# Patient Record
Sex: MALE | Race: BLACK OR AFRICAN AMERICAN | Employment: PART TIME | URBAN - METROPOLITAN AREA
[De-identification: names, ages, dates, MRNs, and addresses within clinical notes are randomized per-mention and may not be internally consistent; named-entity substitution may affect disease eponyms.]

---

## 2017-10-06 ENCOUNTER — OFFICE VISIT (OUTPATIENT)
Dept: FAMILY MEDICINE CLINIC | Age: 22
End: 2017-10-06

## 2017-10-06 VITALS
TEMPERATURE: 98.3 F | BODY MASS INDEX: 32.54 KG/M2 | SYSTOLIC BLOOD PRESSURE: 110 MMHG | OXYGEN SATURATION: 98 % | DIASTOLIC BLOOD PRESSURE: 65 MMHG | HEART RATE: 61 BPM | RESPIRATION RATE: 14 BRPM | WEIGHT: 275.6 LBS | HEIGHT: 77 IN

## 2017-10-06 DIAGNOSIS — M54.31 SCIATICA OF RIGHT SIDE: Primary | ICD-10-CM

## 2017-10-06 RX ORDER — METHOCARBAMOL 500 MG/1
500 TABLET, FILM COATED ORAL 4 TIMES DAILY
Qty: 60 TAB | Refills: 1 | Status: SHIPPED | OUTPATIENT
Start: 2017-10-06

## 2017-10-06 RX ORDER — NAPROXEN 500 MG/1
500 TABLET ORAL 2 TIMES DAILY WITH MEALS
Qty: 60 TAB | Refills: 1 | Status: SHIPPED | OUTPATIENT
Start: 2017-10-06

## 2017-10-06 RX ORDER — MELATONIN 5 MG
5 CAPSULE ORAL
COMMUNITY

## 2017-10-06 NOTE — PATIENT INSTRUCTIONS
Low Back Pain: Exercises  Your Care Instructions  Here are some examples of typical rehabilitation exercises for your condition. Start each exercise slowly. Ease off the exercise if you start to have pain. Your doctor or physical therapist will tell you when you can start these exercises and which ones will work best for you. How to do the exercises  Press-up    1. Lie on your stomach, supporting your body with your forearms. 2. Press your elbows down into the floor to raise your upper back. As you do this, relax your stomach muscles and allow your back to arch without using your back muscles. As your press up, do not let your hips or pelvis come off the floor. 3. Hold for 15 to 30 seconds, then relax. 4. Repeat 2 to 4 times. Alternate arm and leg (bird dog) exercise    Note: Do this exercise slowly. Try to keep your body straight at all times, and do not let one hip drop lower than the other. 1. Start on the floor, on your hands and knees. 2. Tighten your belly muscles. 3. Raise one leg off the floor, and hold it straight out behind you. Be careful not to let your hip drop down, because that will twist your trunk. 4. Hold for about 6 seconds, then lower your leg and switch to the other leg. 5. Repeat 8 to 12 times on each leg. 6. Over time, work up to holding for 10 to 30 seconds each time. 7. If you feel stable and secure with your leg raised, try raising the opposite arm straight out in front of you at the same time. Knee-to-chest exercise    1. Lie on your back with your knees bent and your feet flat on the floor. 2. Bring one knee to your chest, keeping the other foot flat on the floor (or keeping the other leg straight, whichever feels better on your lower back). 3. Keep your lower back pressed to the floor. Hold for at least 15 to 30 seconds. 4. Relax, and lower the knee to the starting position. 5. Repeat with the other leg. Repeat 2 to 4 times with each leg.   6. To get more stretch, put your other leg flat on the floor while pulling your knee to your chest.  Curl-ups    1. Lie on the floor on your back with your knees bent at a 90-degree angle. Your feet should be flat on the floor, about 12 inches from your buttocks. 2. Cross your arms over your chest. If this bothers your neck, try putting your hands behind your neck (not your head), with your elbows spread apart. 3. Slowly tighten your belly muscles and raise your shoulder blades off the floor. 4. Keep your head in line with your body, and do not press your chin to your chest.  5. Hold this position for 1 or 2 seconds, then slowly lower yourself back down to the floor. 6. Repeat 8 to 12 times. Pelvic tilt exercise    1. Lie on your back with your knees bent. 2. \"Brace\" your stomach. This means to tighten your muscles by pulling in and imagining your belly button moving toward your spine. You should feel like your back is pressing to the floor and your hips and pelvis are rocking back. 3. Hold for about 6 seconds while you breathe smoothly. 4. Repeat 8 to 12 times. Heel dig bridging    1. Lie on your back with both knees bent and your ankles bent so that only your heels are digging into the floor. Your knees should be bent about 90 degrees. 2. Then push your heels into the floor, squeeze your buttocks, and lift your hips off the floor until your shoulders, hips, and knees are all in a straight line. 3. Hold for about 6 seconds as you continue to breathe normally, and then slowly lower your hips back down to the floor and rest for up to 10 seconds. 4. Do 8 to 12 repetitions. Hamstring stretch in doorway    1. Lie on your back in a doorway, with one leg through the open door. 2. Slide your leg up the wall to straighten your knee. You should feel a gentle stretch down the back of your leg. 3. Hold the stretch for at least 15 to 30 seconds. Do not arch your back, point your toes, or bend either knee.  Keep one heel touching the floor and the other heel touching the wall. 4. Repeat with your other leg. 5. Do 2 to 4 times for each leg. Hip flexor stretch    1. Kneel on the floor with one knee bent and one leg behind you. Place your forward knee over your foot. Keep your other knee touching the floor. 2. Slowly push your hips forward until you feel a stretch in the upper thigh of your rear leg. 3. Hold the stretch for at least 15 to 30 seconds. Repeat with your other leg. 4. Do 2 to 4 times on each side. Wall sit    1. Stand with your back 10 to 12 inches away from a wall. 2. Lean into the wall until your back is flat against it. 3. Slowly slide down until your knees are slightly bent, pressing your lower back into the wall. 4. Hold for about 6 seconds, then slide back up the wall. 5. Repeat 8 to 12 times. Follow-up care is a key part of your treatment and safety. Be sure to make and go to all appointments, and call your doctor if you are having problems. It's also a good idea to know your test results and keep a list of the medicines you take. Where can you learn more? Go to http://demetri-alessandra.info/. Enter S313 in the search box to learn more about \"Low Back Pain: Exercises. \"  Current as of: March 21, 2017  Content Version: 11.3  © 7487-7434 [employer]. Care instructions adapted under license by [employer] (which disclaims liability or warranty for this information). If you have questions about a medical condition or this instruction, always ask your healthcare professional. [place] [address] any warranty or liability for your use of this information. Gluteal Strain: Rehab Exercises  Your Care Instructions  Here are some examples of typical rehabilitation exercises for your condition. Start each exercise slowly. Ease off the exercise if you start to have pain.   Your doctor or physical therapist will tell you when you can start these exercises and which ones will work best for you. How to do the exercises  Hip rotator stretch    5. Lie on your back with both knees bent and your feet flat on the floor. 6. Put the ankle of your affected leg on your opposite thigh near your knee. 7. Use your hand to gently push the knee of your affected leg away from your body until you feel a gentle stretch around your hip. 8. Hold the stretch for 15 to 30 seconds. 9. Repeat 2 to 4 times. 10. Repeat steps 1 through 5, but this time use your hand to gently pull your knee toward your opposite shoulder. 11. Switch legs and repeat steps 1 through 6, even if only one hip is sore. Seated hip rotator stretch    8. Sit in a sturdy chair. 9. Cross your affected leg over your knee, resting your foot on top of your knee. 10. Keep your back straight, and slowly lean forward until you feel a stretch in your hip. 11. Hold for 15 to 30 seconds. 12. Switch legs and repeat steps 1 through 4 on your other side. 13. Repeat 2 to 4 times. Hamstring stretch (lying down)    7. Lie flat on your back with your legs straight. If you feel discomfort in your back, place a small towel roll under your lower back. 8. Holding the back of your affected leg, lift your leg straight up and toward your body until you feel a stretch at the back of your thigh. 9. Hold the stretch for at least 30 seconds. 10. Repeat 2 to 4 times. 11. Switch legs and repeat steps 1 through 4, even if only one hip is sore. Bridging    7. Lie on your back with both knees bent. Your knees should be bent about 90 degrees. 8. Then push your feet into the floor, squeeze your buttocks, and lift your hips off the floor until your shoulders, hips, and knees are all in a straight line. 9. Hold for about 6 seconds as you continue to breathe normally, and then slowly lower your hips back down to the floor and rest for up to 10 seconds. 10. Repeat 8 to 12 times. Single-leg bridge    5.  Lie on your back, with your arms at your sides.  6. Bend one knee, and keep that foot flat on the floor. The other leg should be straight. 7. Raise the straight leg up so that the knee is level with the bent knee. 8. Tighten your belly muscles by pulling your belly button in toward your spine. Lift your buttocks up and be careful not to let your hips drop down. 9. Hold for about 6 seconds as you continue to breathe normally, and then slowly lower your hips back down to the floor. 10. Switch legs and repeat steps 1 though 5. 11. Repeat 8 to 12 times. Follow-up care is a key part of your treatment and safety. Be sure to make and go to all appointments, and call your doctor if you are having problems. It's also a good idea to know your test results and keep a list of the medicines you take. Where can you learn more? Go to http://demetri-alessandra.info/. Enter W403 in the search box to learn more about \"Gluteal Strain: Rehab Exercises. \"  Current as of: March 21, 2017  Content Version: 11.3  © 6190-1261 [employer]. Care instructions adapted under license by [employer] (which disclaims liability or warranty for this information). If you have questions about a medical condition or this instruction, always ask your healthcare professional. [address] any warranty or liability for your use of this information. Sciatica: Exercises  Your Care Instructions  Here are some examples of typical rehabilitation exercises for your condition. Start each exercise slowly. Ease off the exercise if you start to have pain. Your doctor or physical therapist will tell you when you can start these exercises and which ones will work best for you. When you are not being active, find a comfortable position for rest. Some people are comfortable on the floor or a medium-firm bed with a small pillow under their head and another under their knees.  Some people prefer to lie on their side with a pillow between their knees. Don't stay in one position for too long. Take short walks (10 to 20 minutes) every 2 to 3 hours. Avoid slopes, hills, and stairs until you feel better. Walk only distances you can manage without pain, especially leg pain. How to do the exercises  Back stretches    12. Get down on your hands and knees on the floor. 13. Relax your head and allow it to droop. Round your back up toward the ceiling until you feel a nice stretch in your upper, middle, and lower back. Hold this stretch for as long as it feels comfortable, or about 15 to 30 seconds. 14. Return to the starting position with a flat back while you are on your hands and knees. 15. Let your back sway by pressing your stomach toward the floor. Lift your buttocks toward the ceiling. 16. Hold this position for 15 to 30 seconds. 17. Repeat 2 to 4 times. Follow-up care is a key part of your treatment and safety. Be sure to make and go to all appointments, and call your doctor if you are having problems. It's also a good idea to know your test results and keep a list of the medicines you take. Where can you learn more? Go to http://demetri-alessandra.info/. Enter W877 in the search box to learn more about \"Sciatica: Exercises. \"  Current as of: March 21, 2017  Content Version: 11.3  © 1148-2143 [employer]. Care instructions adapted under license by [email or web address] (which disclaims liability or warranty for this information). If you have questions about a medical condition or this instruction, always ask your healthcare professional. [place] [address] any warranty or liability for your use of this information.

## 2017-10-06 NOTE — PROGRESS NOTES
Discharge instructions reviewed with patient    Medication list and understanding of medications reviewed with patient. OTC and herbal medications reviewed and added to med list if applicable  Barriers to adherence assessed. Guidance given regarding new medications this visit, including reason for taking this medicine, and common side effects. Unable to print AVS at time of discharge.

## 2017-10-06 NOTE — PROGRESS NOTES
\[facility] [place] [facility]\"" 36 4    Chief Complaint   Patient presents with    New Patient     establish healthcare services    Leg Pain     right leg pain x 2 years     1. When and where did you last receive medical care? Yes When: 10/2011-PCP     2. When and where did you last have preventive care such as mammogram, pap smears or colon screening? N/A    3. What is your current living situation (for example, live alone, live in home with immediate family members)? Lives with mother    4. Do you have any problems with communication such trouble seeing, hearing, or understanding instructions? No    5. Do you have an advance directive? This is a document that you can give to family members with instructions for how you would want them to make health care decisions for you if you were unable to speak for yourself. (For example, unconscious, delerious)No    PMH/FH/Social Hx reviewed and updated as needed     Applicable screenings reviewed and updated as needed  Medication reconciliation performed. Patient does not know need medication refills. Health Maintenance reviewed. Dewayne Arriaga

## 2017-10-06 NOTE — PROGRESS NOTES
ELENA Bear is a 23 y.o. male being seen today for has pain to his lower back that shoots to his hip and down his right leg. 2-3 years ago this pain started and pt cannot recall trauma. Played sports in [employer]. More recently he is bending more often and lifting and using his back. Chief Complaint   Patient presents with    New Patient     establish healthcare services    Leg Pain     right leg pain x 2 years   . he states that he has not tried anything but icy hot patches. Recently pain started 1.5 weeks ago. Feels pain right when he wakes up. Past Medical History:   Diagnosis Date    Dislocation of left shoulder joint     x3    Dislocation of right shoulder joint     x2         ROS  Patient states that he is feeling well. Denies complaints of chest pain, shortness of breath, swelling of legs, dizziness or weakness. he denies nausea, vomiting or diarrhea. Current Outpatient Prescriptions   Medication Sig    melatonin 5 mg cap capsule Take 5 mg by mouth nightly.  methocarbamol (ROBAXIN) 500 mg tablet Take 1 Tab by mouth four (4) times daily.  naproxen (NAPROSYN) 500 mg tablet Take 1 Tab by mouth two (2) times daily (with meals).  hydrocodone-acetaminophen (NORCO) 5-325 mg per tablet Take 1 tablet by mouth every four (4) hours as needed for Pain. No current facility-administered medications for this visit. PE  Visit Vitals    /65 (BP 1 Location: Left arm, BP Patient Position: Sitting)    Pulse 61    Temp 98.3 °F (36.8 °C) (Oral)    Resp 14    Ht 6' 5\" (1.956 m)    Wt 275 lb 9.6 oz (125 kg)    SpO2 98%    BMI 32.68 kg/m2        Alert and oriented with normal mood and affect. he is well developed and well nourished . Lungs are clear without wheezing. Heart rate is regular without murmurs or gallops. There is no lower extremity edema. Right hip non tender with FROM. Right straight leg raise caused pain at right glut muscle.       No results found for this or any previous visit.       Assessment and Plan:        ICD-10-CM ICD-9-CM    1. Sciatica of right side M54.31 724.3 methocarbamol (ROBAXIN) 500 mg tablet      naproxen (NAPROSYN) 500 mg tablet     Symptom mgmt and PT home exercises, f/u prn no improvement      Luther Grover, NP

## 2020-11-17 ENCOUNTER — OFFICE VISIT (OUTPATIENT)
Dept: FAMILY MEDICINE CLINIC | Age: 25
End: 2020-11-17
Payer: MEDICAID

## 2020-11-17 VITALS
HEART RATE: 63 BPM | WEIGHT: 285 LBS | OXYGEN SATURATION: 99 % | SYSTOLIC BLOOD PRESSURE: 127 MMHG | HEIGHT: 78 IN | DIASTOLIC BLOOD PRESSURE: 66 MMHG | TEMPERATURE: 99.2 F | RESPIRATION RATE: 18 BRPM | BODY MASS INDEX: 32.97 KG/M2

## 2020-11-17 DIAGNOSIS — G89.29 CHRONIC MIDLINE LOW BACK PAIN WITHOUT SCIATICA: ICD-10-CM

## 2020-11-17 DIAGNOSIS — M54.50 CHRONIC MIDLINE LOW BACK PAIN WITHOUT SCIATICA: ICD-10-CM

## 2020-11-17 DIAGNOSIS — E66.9 OBESITY (BMI 30-39.9): ICD-10-CM

## 2020-11-17 DIAGNOSIS — Z00.00 GENERAL MEDICAL EXAM: Primary | ICD-10-CM

## 2020-11-17 PROCEDURE — 99385 PREV VISIT NEW AGE 18-39: CPT | Performed by: FAMILY MEDICINE

## 2020-11-17 NOTE — PROGRESS NOTES
Chief Complaint   Patient presents with    Establish Care    Complete Physical    Neck Pain     back pain      1. Have you been to the ER, urgent care clinic since your last visit? Hospitalized since your last visit? No    2. Have you seen or consulted any other health care providers outside of the [address] [place] [address] since your last visit? Include any pap smears or colon screening. No     HPI  Junie Ackerman comes in to establish care. He would like to have complete physical exam.  We will do a CBC, CMP and lipid panel. Neck and back pain: patient has chronic back pain with herniated discs, he has a h/o exertion in the past. Has not been seen by the spine specialist. He had an MRI done of the lumbar spine in 2018. Continues to have pain, would like to see the spine specialist.  He can continue with Aleve as needed for pain. Referral is placed to the spine specialist.  Obesity: Patient has a BMI of 32.11. He will intensify lifestyle and dietary modification. Past Medical History  Past Medical History:   Diagnosis Date    Dislocation of left shoulder joint     x3    Dislocation of right shoulder joint     x2       Surgical History  Past Surgical History:   Procedure Laterality Date    HX HERNIA REPAIR  2008    HX HERNIA REPAIR  2008        Medications  Current Outpatient Medications   Medication Sig Dispense Refill    melatonin 5 mg cap capsule Take 5 mg by mouth nightly.  methocarbamol (ROBAXIN) 500 mg tablet Take 1 Tab by mouth four (4) times daily. 60 Tab 1    naproxen (NAPROSYN) 500 mg tablet Take 1 Tab by mouth two (2) times daily (with meals). 60 Tab 1    hydrocodone-acetaminophen (NORCO) 5-325 mg per tablet Take 1 tablet by mouth every four (4) hours as needed for Pain.  12 tablet 0       Allergies  Allergies   Allergen Reactions    Milk Diarrhea       Family History  Family History   Problem Relation Age of Onset    Hypertension Other         Uncle    Other Other Heart problems: Uncle, grandmother   [facility] Arthritis-osteo Mother        Social History  Social History     Socioeconomic History    Marital status: SINGLE     Spouse name: Not on file    Number of children: Not on file    Years of education: Not on file    Highest education level: Not on file   Occupational History    Not on file   Social Needs    Financial resource strain: Not on file    Food insecurity     Worry: Not on file     Inability: Not on file    Transportation needs     Medical: Not on file     Non-medical: Not on file   Tobacco Use    Smoking status: Never Smoker    Smokeless tobacco: Never Used   Substance and Sexual Activity    Alcohol use: Yes     Alcohol/week: 1.0 standard drinks     Types: 1 Shots of liquor per week    Drug use: Yes     Frequency: 4.0 times per week     Types: Marijuana    Sexual activity: Yes     Partners: Female     Birth control/protection: Condom   Lifestyle    Physical activity     Days per week: Not on file     Minutes per session: Not on file    Stress: Not on file   Relationships    Social connections     Talks on phone: Not on file     Gets together: Not on file     Attends [employer] service: Not on file     Active member of club or organization: Not on file     Attends meetings of clubs or organizations: Not on file     Relationship status: Not on file    Intimate partner violence     Fear of current or ex partner: Not on file     Emotionally abused: Not on file     Physically abused: Not on file     Forced sexual activity: Not on file   Other Topics Concern    Not on file   Social History Narrative    Not on file       Review of Systems  Review of Systems - Review of all systems is negative except as noted above in the HPI.     Vital Signs  Visit Vitals  /66 (BP 1 Location: Left arm, BP Patient Position: Sitting)   Pulse 63   Temp 99.2 °F (37.3 °C) (Oral)   Resp 18   Ht 6' 7\" (2.007 m)   Wt 285 lb (129.3 kg)   SpO2 99%   BMI 32.11 kg/m² Physical Exam  Physical Examination: General appearance - alert, well appearing, and in no distress, oriented to person, place, and time, overweight, acyanotic, in no respiratory distress and well hydrated  Mental status - alert, oriented to person, place, and time, normal mood, behavior, speech, dress, motor activity, and thought processes  Neck -paracervical muscle tightness  Lymphatics - no palpable lymphadenopathy, no hepatosplenomegaly  Chest - clear to auscultation, no wheezes, rales or rhonchi, symmetric air entry  Heart - normal rate and regular rhythm, S1 and S2 normal  Abdomen - no rebound tenderness noted  Back exam - limited range of motion  Neurological - alert, oriented, normal speech, no focal findings or movement disorder noted  Musculoskeletal - no joint tenderness, deformity or swelling  Extremities - no pedal edema noted, intact peripheral pulses      Results  No results found for this or any previous visit. ASSESSMENT and PLAN    ICD-10-CM ICD-9-CM    1. General medical exam  Z00.00 V70.9 CBC WITH AUTOMATED DIFF      METABOLIC PANEL, COMPREHENSIVE      LIPID PANEL   2. Chronic midline low back pain without sciatica  M54.5 724.2 REFERRAL TO ORTHOPEDICS    G89.29 338.29    3. Obesity (BMI 30-39. 9)  E66.9 278.00      lab results and schedule of future lab studies reviewed with patient  reviewed diet, exercise and weight control  cardiovascular risk and specific lipid/LDL goals reviewed  reviewed medications and side effects in detail      I have discussed the diagnosis with the patient and the intended plan of care as seen in the above orders. The patient has received an after-visit summary and questions were answered concerning future plans. I have discussed medication, side effects, and warnings with the patient in detail. The patient verbalized understanding and is in agreement with the plan of care. The patient will contact the office with any additional concerns.     Eliu Krishna, MD    PLEASE NOTE:   This document has been produced using voice recognition software.  Unrecognized errors in transcription may be present

## 2020-11-23 ENCOUNTER — LAB ONLY (OUTPATIENT)
Dept: FAMILY MEDICINE CLINIC | Age: 25
End: 2020-11-23
Payer: MEDICAID

## 2020-11-23 ENCOUNTER — TELEPHONE (OUTPATIENT)
Dept: FAMILY MEDICINE CLINIC | Age: 25
End: 2020-11-23

## 2020-11-23 DIAGNOSIS — Z01.89 ENCOUNTER FOR LABORATORY TEST: Primary | ICD-10-CM

## 2020-11-23 PROCEDURE — 36415 COLL VENOUS BLD VENIPUNCTURE: CPT | Performed by: FAMILY MEDICINE

## 2020-11-23 NOTE — TELEPHONE ENCOUNTER
Patient called requesting letter excusing him from jury duty due to his spinal cord. Patient is aware provider is out of the office until 11/30/20.

## 2020-11-23 NOTE — PROGRESS NOTES
[facility]  [address], [place]   Phone: [phone]  Fax: [phone]        INITIAL CONSULTATION      HISTORY OF PRESENT ILLNESS:  Donnie Stoll is a 30 y.o. male whom is referred from [facility], Breanne Cooper MD secondary to right-sided low back pain x 2017 without trauma. He rates his pain 0-3/10. He denies radicular symptoms. His pain is not exacerbated positionally. He has treated with Robaxin, Naprosyn and Norco. Pt previously received chiropractic treatment. Pt completed PT in 2017 with benefit. He performs his HEP 3 x/week. Therapy notes reviewed. Patient denies previous spinal surgery or injections. Pt denies change in bowel or bladder habits. Pt denies fever, weight loss, or skin changes. PmHx of obesity. Note from Dr. Nagi Wadsworth dated 12/4/2017 indicating patient was seen with c/o acute right-sided low back pain. Treated with Meloxicam, PT, and Flexeril. ER note from Dr. Jordin Padron dated 10/29/2017 indicating patient presented for back pain with sciatica. Began that day without injury. Treated with MDP. L spine MRI dated 11/21/2017 films not available for my independent review. Per report, 6 lumbar-type vertebrae with transitional thoracolumbar segment. Developmental mild to moderate lumbar canal stenosis. Moderate L4-5 and L5-S1, mild to moderate L3-4 and minimal L2-3 central stenosis from additional minimal mild disc bulging and spondylosis with outer annular fissures. Small right posterolateral protruded L5-S1 disc herniation with impingement on the right traversing S1 nerve root. Minimal right paracentral protruded L3-4 disc herniation with impingement on the right traversing L4 nerve root. Mild right L3-4 facet joint osteoarthritis. 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 The patient is LHD.  reviewed. Body mass index is 32.42 kg/m².     PCP: Miah Grove MD    Past Medical History:   Diagnosis Date    Dislocation of left shoulder joint     x3    Dislocation of right shoulder joint     x2          Past Surgical History:   Procedure Laterality Date    HX HERNIA REPAIR  2008    HX HERNIA REPAIR  2008         Social History     Tobacco Use    Smoking status: Never Smoker    Smokeless tobacco: Never Used   Substance Use Topics    Alcohol use: Yes     Alcohol/week: 1.0 standard drinks     Types: 1 Shots of liquor per week       Work status: The patient is employed. Marital status: Single. Current Outpatient Medications   Medication Sig Dispense Refill    melatonin 5 mg cap capsule Take 5 mg by mouth nightly.  methocarbamol (ROBAXIN) 500 mg tablet Take 1 Tab by mouth four (4) times daily. (Patient not taking: Reported on 11/24/2020) 60 Tab 1    naproxen (NAPROSYN) 500 mg tablet Take 1 Tab by mouth two (2) times daily (with meals). (Patient not taking: Reported on 11/24/2020) 60 Tab 1    hydrocodone-acetaminophen (NORCO) 5-325 mg per tablet Take 1 tablet by mouth every four (4) hours as needed for Pain. (Patient not taking: Reported on 11/24/2020) 12 tablet 0       Allergies   Allergen Reactions    Milk Diarrhea            Family History   Problem Relation Age of Onset    Hypertension Other         Uncle    Other Other         Heart problems: Uncle, grandmother   [facility] Arthritis-osteo Mother          REVIEW OF SYSTEMS  Constitutional symptoms: Negative  Eyes: Negative  Ears, Nose, Throat, and Mouth: Negative  Cardiovascular: Negative  Respiratory: Negative  Genitourinary: Negative  Integumentary (Skin and/or breast): Negative  Musculoskeletal: Positive for right-sided low back pain. Extremities: Negative for edema.   Endocrine/Rheumatologic: Negative  Hematologic/Lymphatic: Negative  Allergic/Immunologic: Negative  Psychiatric: Negative       PHYSICAL EXAMINATION  Visit Vitals  /74 (BP 1 Location: Left arm, BP Patient Position: Sitting)   Pulse 72   Temp 98.9 °F (37.2 °C)   Resp 16   Ht 6' 7\" (2.007 m)   Wt 287 lb 12.8 oz (130.5 kg)   SpO2 98%   BMI 32.42 kg/m²       CONSTITUTIONAL: NAD, A&O x 3  HEART: Regular rate and rhythm  GASTROINTESTINAL: Positive bowel sounds, soft, nontender, and nondistended  LUNGS: Clear to auscultation bilaterally. SKIN: Negative for rash. RANGE OF MOTION: The patient has full passive range of motion in all four extremities. SENSATION: Slight decreased sensation to light touch on the RLE in a S1 distribution. Otherwise, sensation is intact to light touch throughout. MOTOR:   Straight Leg Raise: Negative, bilateral  Clemens: Negative, bilateral  Deep tendon reflexes are 1 at the biceps, triceps, and brachioradialis bilaterally. Deep tendon reflexes are 0 at the knees and ankles bilaterally. Shoulder AB/Flex Elbow Flex Wrist Ext Elbow Ext Wrist Flex Hand Intrin Tone   Right +4/5 +4/5 +4/5 +4/5 +4/5 +4/5 +4/5   Left +4/5 +4/5 +4/5 +4/5 +4/5 +4/5 +4/5              Hip Flex Knee Ext Knee Flex Ankle DF GTE Ankle PF Tone   Right +4/5 +4/5 +4/5 +4/5 +4/5 +4/5 +4/5   Left +4/5 +4/5 +4/5 +4/5 +4/5 +4/5 +4/5     RADIOGRAPHS  Preliminary reading of L spine plain films dated 11/24/2020 revealed:  Mild dextrorotatory scoliosis apex L1 convex to the left. 6 lumbar vertebral bodies. Mild disc space narrowing at L6-S1. No acute pathology identified. These are being sent out for official reading by Dr. Iftikhar Salmon. ASSESSMENT   Diagnoses and all orders for this visit:    1. Low back pain at multiple sites  -     AMB POC XRAY, SPINE, LUMBOSACRAL; 2 O    2. HNP (herniated nucleus pulposus), lumbar    3. Scoliosis, unspecified scoliosis type, unspecified spinal region    Other orders  -     REFERRAL TO PHYSICAL THERAPY      IMPRESSIONS/RECOMMENDATIONS:  Patient presents today with c/o right-sided lower back pain . Multiple treatment options were discussed. I will refer him to physical therapy with an emphasis on HEP.  Patient is neurologically intact. I will see the patient back in 6 week's time or earlier if needed. Written by Lashawn Huang, as dictated by Jessica Barney MD  I examined the patient, reviewed and agree with the note.

## 2020-11-23 NOTE — PROGRESS NOTES
Patient in today for lab visit at this time. Patient tolerated well. Labs ordered by PCP at this time. No other concerns noted at this time. Venipunture to patient left forearm

## 2020-11-24 ENCOUNTER — OFFICE VISIT (OUTPATIENT)
Dept: ORTHOPEDIC SURGERY | Age: 25
End: 2020-11-24
Payer: MEDICAID

## 2020-11-24 VITALS
DIASTOLIC BLOOD PRESSURE: 74 MMHG | BODY MASS INDEX: 33.3 KG/M2 | HEART RATE: 72 BPM | RESPIRATION RATE: 16 BRPM | HEIGHT: 78 IN | OXYGEN SATURATION: 98 % | WEIGHT: 287.8 LBS | SYSTOLIC BLOOD PRESSURE: 115 MMHG | TEMPERATURE: 98.9 F

## 2020-11-24 DIAGNOSIS — M51.26 HNP (HERNIATED NUCLEUS PULPOSUS), LUMBAR: ICD-10-CM

## 2020-11-24 DIAGNOSIS — M54.50 LOW BACK PAIN AT MULTIPLE SITES: Primary | ICD-10-CM

## 2020-11-24 DIAGNOSIS — M41.9 SCOLIOSIS, UNSPECIFIED SCOLIOSIS TYPE, UNSPECIFIED SPINAL REGION: ICD-10-CM

## 2020-11-24 PROCEDURE — 99204 OFFICE O/P NEW MOD 45 MIN: CPT | Performed by: PHYSICAL MEDICINE & REHABILITATION

## 2020-11-24 PROCEDURE — 72100 X-RAY EXAM L-S SPINE 2/3 VWS: CPT | Performed by: PHYSICAL MEDICINE & REHABILITATION

## 2020-11-24 NOTE — LETTER
11/24/20 Patient: Susi Bullock YOB: 1995 Date of Visit: 11/24/2020 Nivia Ward MD 
[facility] [address] [address] [place] [id number] VIA In Basket Dear Nivia Ward MD, Thank you for referring Mr. Moy Keen to [address] for evaluation. My notes for this consultation are attached. If you have questions, please do not hesitate to call me. I look forward to following your patient along with you. Sincerely, Shelbie Olivera MD

## 2020-11-30 LAB
ALBUMIN SERPL-MCNC: 4.5 G/DL (ref 4.1–5.2)
ALBUMIN/GLOB SERPL: 1.6 {RATIO} (ref 1.2–2.2)
ALP SERPL-CCNC: 85 IU/L (ref 39–117)
ALT SERPL-CCNC: 11 IU/L (ref 0–44)
AST SERPL-CCNC: 19 IU/L (ref 0–40)
BASOPHILS # BLD AUTO: NORMAL 10*3/UL
BILIRUB SERPL-MCNC: 0.3 MG/DL (ref 0–1.2)
BUN SERPL-MCNC: 10 MG/DL (ref 6–20)
BUN/CREAT SERPL: 8 (ref 9–20)
CALCIUM SERPL-MCNC: 9.3 MG/DL (ref 8.7–10.2)
CHLORIDE SERPL-SCNC: 99 MMOL/L (ref 96–106)
CHOLEST SERPL-MCNC: 155 MG/DL (ref 100–199)
CO2 SERPL-SCNC: 21 MMOL/L (ref 20–29)
CREAT SERPL-MCNC: 1.22 MG/DL (ref 0.76–1.27)
EOSINOPHIL # BLD AUTO: NORMAL 10*3/UL
EOSINOPHIL NFR BLD AUTO: NORMAL %
GLOBULIN SER CALC-MCNC: 2.8 G/DL (ref 1.5–4.5)
GLUCOSE SERPL-MCNC: 75 MG/DL (ref 65–99)
HCT VFR BLD AUTO: NORMAL %
HDLC SERPL-MCNC: 35 MG/DL
HGB BLD-MCNC: NORMAL G/DL
INTERPRETATION, 910389: NORMAL
LDLC SERPL CALC-MCNC: 103 MG/DL (ref 0–99)
LYMPHOCYTES # BLD AUTO: NORMAL 10*3/UL
LYMPHOCYTES NFR BLD AUTO: NORMAL %
MONOCYTES NFR BLD AUTO: NORMAL %
NEUTROPHILS NFR BLD AUTO: NORMAL %
PLATELET # BLD AUTO: NORMAL 10*3/UL
POTASSIUM SERPL-SCNC: 4.6 MMOL/L (ref 3.5–5.2)
PROT SERPL-MCNC: 7.3 G/DL (ref 6–8.5)
RBC # BLD AUTO: NORMAL 10*6/UL
SODIUM SERPL-SCNC: 140 MMOL/L (ref 134–144)
TRIGL SERPL-MCNC: 89 MG/DL (ref 0–149)
VLDLC SERPL CALC-MCNC: 17 MG/DL (ref 5–40)
WBC # BLD AUTO: NORMAL X10E3/UL

## 2021-01-04 NOTE — PROGRESS NOTES
[facility]  [address], [place]   Phone: [phone]  Fax: [phone]        PROGRESS NOTE      HISTORY OF PRESENT ILLNESS:  The patient is a 30 y.o. male and was seen today for follow up of right-sided low back pain x 2017 without trauma. He denies radicular symptoms. His pain is not exacerbated positionally. He has treated with Robaxin, Naprosyn and Norco. Pt previously received chiropractic treatment. Pt completed PT in 2017 with benefit. He performs his HEP 3 x/week. Therapy notes reviewed. Patient denies previous spinal surgery or injections. Pt denies change in bowel or bladder habits. Pt denies fever, weight loss, or skin changes. The patient is LHD. Note from Dr. Phyllistine Duverney dated 12/4/2017 indicating patient was seen with c/o acute right-sided low back pain. Treated with Meloxicam, PT, and Flexeril. ER note from Dr. Antonio Argueta dated 10/29/2017 indicating patient presented for back pain with sciatica. Began that day without injury. Treated with MDP. L spine MRI dated 11/21/2017 films not available for my independent review. Per report, 6 lumbar-type vertebrae with transitional thoracolumbar segment. Developmental mild to moderate lumbar canal stenosis. Moderate L4-5 and L5-S1, mild to moderate L3-4 and minimal L2-3 central stenosis from additional minimal mild disc bulging and spondylosis with outer annular fissures. Small right posterolateral protruded L5-S1 disc herniation with impingement on the right traversing S1 nerve root. Minimal right paracentral protruded L3-4 disc herniation with impingement on the right traversing L4 nerve root. Mild right L3-4 facet joint osteoarthritis. 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 Preliminary reading of L spine plain films dated 11/24/2020 revealed: Mild dextrorotatory scoliosis apex L1 convex to the left. 6 lumbar vertebral bodies.  Mild disc space narrowing at L6-S1. No acute pathology identified. At his last clinical appointment, I referred him to physical therapy with an emphasis on HEP. The patient returns today and reports pain location and distribution remains unchanged. He rates his pain 0-3/10, unchanged. Pt completed PT with some benefit. He is compliant with his HEP. Pt denies change in bowel or bladder habits.  reviewed. Body mass index is 31.52 kg/m². PCP: Bri Riggs MD      Past Medical History:   Diagnosis Date    Dislocation of left shoulder joint     x3    Dislocation of right shoulder joint     x2        Social History     Socioeconomic History    Marital status: SINGLE     Spouse name: Not on file    Number of children: Not on file    Years of education: Not on file    Highest education level: Not on file   Occupational History    Not on file   Social Needs    Financial resource strain: Not on file    Food insecurity     Worry: Not on file     Inability: Not on file    Transportation needs     Medical: Not on file     Non-medical: Not on file   Tobacco Use    Smoking status: Never Smoker    Smokeless tobacco: Never Used   Substance and Sexual Activity    Alcohol use:  Yes     Alcohol/week: 1.0 standard drinks     Types: 1 Shots of liquor per week    Drug use: Yes     Frequency: 4.0 times per week     Types: Marijuana    Sexual activity: Yes     Partners: Female     Birth control/protection: Condom   Lifestyle    Physical activity     Days per week: Not on file     Minutes per session: Not on file    Stress: Not on file   Relationships    Social connections     Talks on phone: Not on file     Gets together: Not on file     Attends Jewish service: Not on file     Active member of club or organization: Not on file     Attends meetings of clubs or organizations: Not on file     Relationship status: Not on file    Intimate partner violence     Fear of current or ex partner: Not on file Emotionally abused: Not on file     Physically abused: Not on file     Forced sexual activity: Not on file   Other Topics Concern    Not on file   Social History Narrative    Not on file       Current Outpatient Medications   Medication Sig Dispense Refill    melatonin 5 mg cap capsule Take 5 mg by mouth nightly.  methocarbamol (ROBAXIN) 500 mg tablet Take 1 Tab by mouth four (4) times daily. (Patient not taking: Reported on 11/24/2020) 60 Tab 1    naproxen (NAPROSYN) 500 mg tablet Take 1 Tab by mouth two (2) times daily (with meals). (Patient not taking: Reported on 11/24/2020) 60 Tab 1    hydrocodone-acetaminophen (NORCO) 5-325 mg per tablet Take 1 tablet by mouth every four (4) hours as needed for Pain. (Patient not taking: Reported on 11/24/2020) 12 tablet 0       Allergies   Allergen Reactions    Milk Diarrhea          PHYSICAL EXAMINATION    Visit Vitals  /71 (BP 1 Location: Left arm, BP Patient Position: Sitting)   Pulse 70   Temp 97.3 °F (36.3 °C) (Temporal)   Wt 279 lb 12.8 oz (126.9 kg)   SpO2 97%   BMI 31.52 kg/m²       CONSTITUTIONAL: NAD, A&O x 3  SENSATION: Intact to light touch throughout  RANGE OF MOTION: The patient has full passive range of motion in all four extremities. MOTOR:  Straight Leg Raise: Negative, bilateral                 Hip Flex Knee Ext Knee Flex Ankle DF GTE Ankle PF Tone   Right +4/5 +4/5 +4/5 +4/5 +4/5 +4/5 +4/5   Left +4/5 +4/5 +4/5 +4/5 +4/5 +4/5 +4/5       ASSESSMENT   Diagnoses and all orders for this visit:    1. Low back pain at multiple sites    2. HNP (herniated nucleus pulposus), lumbar    3. Scoliosis, unspecified scoliosis type, unspecified spinal region        IMPRESSION AND PLAN:  Patient returns to the office today with c/o right-sided low back pain. Multiple treatment options were discussed. His symptoms are relatively mild at this time. I encouraged him to continue to perform his daily HEP. Patient is neurologically intact.  I will see the patient back prn. Written by Enrique Rivas, as dictated by Angy Ness MD  I examined the patient, reviewed and agree with the note.

## 2021-01-06 ENCOUNTER — OFFICE VISIT (OUTPATIENT)
Dept: ORTHOPEDIC SURGERY | Age: 26
End: 2021-01-06
Payer: MEDICAID

## 2021-01-06 VITALS
HEART RATE: 70 BPM | OXYGEN SATURATION: 97 % | BODY MASS INDEX: 31.52 KG/M2 | WEIGHT: 279.8 LBS | SYSTOLIC BLOOD PRESSURE: 122 MMHG | DIASTOLIC BLOOD PRESSURE: 71 MMHG | TEMPERATURE: 97.3 F

## 2021-01-06 DIAGNOSIS — M51.26 HNP (HERNIATED NUCLEUS PULPOSUS), LUMBAR: ICD-10-CM

## 2021-01-06 DIAGNOSIS — M41.9 SCOLIOSIS, UNSPECIFIED SCOLIOSIS TYPE, UNSPECIFIED SPINAL REGION: ICD-10-CM

## 2021-01-06 DIAGNOSIS — M54.50 LOW BACK PAIN AT MULTIPLE SITES: Primary | ICD-10-CM

## 2021-01-06 PROCEDURE — 99213 OFFICE O/P EST LOW 20 MIN: CPT | Performed by: PHYSICAL MEDICINE & REHABILITATION

## 2021-01-06 NOTE — LETTER
1/6/2021 Patient: Dayana Guerrero YOB: 1995 Date of Visit: 1/6/2021 Lisa Richards MD 
[facility] [address] [place] [address] Via In [employer] Dear Lisa Richards MD, Thank you for referring Mr. Andres Mendez to [address] for evaluation. My notes for this consultation are attached. If you have questions, please do not hesitate to call me. I look forward to following your patient along with you. Sincerely, Lenora Jean MD

## 2021-03-04 ENCOUNTER — OFFICE VISIT (OUTPATIENT)
Dept: FAMILY MEDICINE CLINIC | Age: 26
End: 2021-03-04

## 2021-03-04 VITALS
TEMPERATURE: 98 F | RESPIRATION RATE: 20 BRPM | DIASTOLIC BLOOD PRESSURE: 64 MMHG | BODY MASS INDEX: 32.4 KG/M2 | WEIGHT: 280 LBS | SYSTOLIC BLOOD PRESSURE: 119 MMHG | HEART RATE: 70 BPM | HEIGHT: 78 IN | OXYGEN SATURATION: 98 %

## 2021-03-04 DIAGNOSIS — M25.512 CHRONIC LEFT SHOULDER PAIN: Primary | ICD-10-CM

## 2021-03-04 DIAGNOSIS — G89.29 CHRONIC LEFT SHOULDER PAIN: Primary | ICD-10-CM

## 2021-03-04 NOTE — PROGRESS NOTES
HPI  Hoa Romero comes in for follow-up care. Shoulder pain: Patient has left shoulder pain. He injects left shoulder while in high school playing football. He dislocated and he had to pop it back in. Now has a clicking sensation and a catching sensation especially with overhead motions, flexion and extension. He also has discomfort with abduction. No swelling or redness. I will order an x-ray of the shoulder. I will place a referral for him to be seen by the orthopedist.      Past Medical History  Past Medical History:   Diagnosis Date    Dislocation of left shoulder joint     x3    Dislocation of right shoulder joint     x2       Surgical History  Past Surgical History:   Procedure Laterality Date    HX HERNIA REPAIR  2008    HX HERNIA REPAIR  2008        Medications  Current Outpatient Medications   Medication Sig Dispense Refill    melatonin 5 mg cap capsule Take 5 mg by mouth nightly.  methocarbamol (ROBAXIN) 500 mg tablet Take 1 Tab by mouth four (4) times daily. (Patient not taking: Reported on 11/24/2020) 60 Tab 1    naproxen (NAPROSYN) 500 mg tablet Take 1 Tab by mouth two (2) times daily (with meals). (Patient not taking: Reported on 11/24/2020) 60 Tab 1    hydrocodone-acetaminophen (NORCO) 5-325 mg per tablet Take 1 tablet by mouth every four (4) hours as needed for Pain.  (Patient not taking: Reported on 11/24/2020) 12 tablet 0       Allergies  Allergies   Allergen Reactions    Milk Diarrhea       Family History  Family History   Problem Relation Age of Onset    Hypertension Other         Uncle    Other Other         Heart problems: Uncle, grandmother   [facility] Arthritis-osteo Mother        Social History  Social History     Socioeconomic History    Marital status: SINGLE     Spouse name: Not on file    Number of children: Not on file    Years of education: Not on file    Highest education level: Not on file   Occupational History    Not on file   Social Needs    Financial resource strain: Not on file    Food insecurity     Worry: Not on file     Inability: Not on file    Transportation needs     Medical: Not on file     Non-medical: Not on file   Tobacco Use    Smoking status: Never Smoker    Smokeless tobacco: Never Used   Substance and Sexual Activity    Alcohol use: Yes     Alcohol/week: 1.0 standard drinks     Types: 1 Shots of liquor per week    Drug use: Not Currently     Frequency: 4.0 times per week     Types: Marijuana    Sexual activity: Yes     Partners: Female     Birth control/protection: Condom   Lifestyle    Physical activity     Days per week: Not on file     Minutes per session: Not on file    Stress: Not on file   Relationships    Social connections     Talks on phone: Not on file     Gets together: Not on file     Attends Rastafari service: Not on file     Active member of club or organization: Not on file     Attends meetings of clubs or organizations: Not on file     Relationship status: Not on file    Intimate partner violence     Fear of current or ex partner: Not on file     Emotionally abused: Not on file     Physically abused: Not on file     Forced sexual activity: Not on file   Other Topics Concern    Not on file   Social History Narrative    Not on file       Review of Systems  Review of Systems - Review of all systems is negative except as noted above in the HPI.       Vital Signs  Visit Vitals  /64 (BP 1 Location: Left upper arm, BP Patient Position: Sitting, BP Cuff Size: Adult long)   Pulse 70   Temp 98 °F (36.7 °C) (Temporal)   Resp 20   Ht 6' 7\" (2.007 m)   Wt 280 lb (127 kg)   SpO2 98%   BMI 31.54 kg/m²         Physical Exam  Physical Examination: General appearance - alert, well appearing, and in no distress and oriented to person, place, and time  Mental status - alert, oriented to person, place, and time, affect appropriate to mood  Chest - clear to auscultation, no wheezes, rales or rhonchi, symmetric air entry  Heart - normal rate and regular rhythm  Back exam - full range of motion, no tenderness, palpable spasm or pain on motion  Neurological - neck supple without rigidity  Musculoskeletal -left shoulder without tenderness to palpation of the shoulder margins, discomfort to extension, abduction and overhead motions  Extremities - no pedal edema noted, intact peripheral pulses      Results  Results for orders placed or performed in visit on 11/17/20   LIPID PANEL   Result Value Ref Range    Cholesterol, total 155 100 - 199 mg/dL    Triglyceride 89 0 - 149 mg/dL    HDL Cholesterol 35 (L) >39 mg/dL    VLDL, calculated 17 5 - 40 mg/dL    LDL, calculated 103 (H) 0 - 99 mg/dL   METABOLIC PANEL, COMPREHENSIVE   Result Value Ref Range    Glucose 75 65 - 99 mg/dL    BUN 10 6 - 20 mg/dL    Creatinine 1.22 0.76 - 1.27 mg/dL    GFR est non-AA 81 >59 mL/min/1.73    GFR est AA 93 >59 mL/min/1.73    BUN/Creatinine ratio 8 (L) 9 - 20    Sodium 140 134 - 144 mmol/L    Potassium 4.6 3.5 - 5.2 mmol/L    Chloride 99 96 - 106 mmol/L    CO2 21 20 - 29 mmol/L    Calcium 9.3 8.7 - 10.2 mg/dL    Protein, total 7.3 6.0 - 8.5 g/dL    Albumin 4.5 4.1 - 5.2 g/dL    GLOBULIN, TOTAL 2.8 1.5 - 4.5 g/dL    A-G Ratio 1.6 1.2 - 2.2    Bilirubin, total 0.3 0.0 - 1.2 mg/dL    Alk. phosphatase 85 39 - 117 IU/L    AST (SGOT) 19 0 - 40 IU/L    ALT (SGPT) 11 0 - 44 IU/L   CBC WITH AUTOMATED DIFF   Result Value Ref Range    WBC CANCELED x10E3/uL    RBC CANCELED     HGB CANCELED     HCT CANCELED     PLATELET CANCELED     NEUTROPHILS CANCELED     Lymphocytes CANCELED     MONOCYTES CANCELED     EOSINOPHILS CANCELED     Abs Lymphocytes CANCELED     ABS. EOSINOPHILS CANCELED     ABS. BASOPHILS CANCELED    CVD REPORT   Result Value Ref Range    INTERPRETATION Note        ASSESSMENT and PLAN    ICD-10-CM ICD-9-CM    1.  Chronic left shoulder pain  M25.512 719.41 XR SHOULDER LT AP/LAT MIN 2 V    G89.29 338.29 REFERRAL TO ORTHOPEDICS     reviewed diet, exercise and weight control  radiology results and schedule of future radiology studies reviewed with patient      I have discussed the diagnosis with the patient and the intended plan of care as seen in the above orders. The patient has received an after-visit summary and questions were answered concerning future plans. I have discussed medication, side effects, and warnings with the patient in detail. The patient verbalized understanding and is in agreement with the plan of care. The patient will contact the office with any additional concerns. Rosa Angeles MD    PLEASE NOTE:   This document has been produced using voice recognition software.  Unrecognized errors in transcription may be present

## 2021-03-04 NOTE — PROGRESS NOTES
Eric Garza presents today for   Chief Complaint   Patient presents with    Shoulder Pain     left ongoing       Is someone accompanying this pt? no    Is the patient using any DME equipment during OV? no    Depression Screening:  3 most recent PHQ Screens 3/4/2021   Little interest or pleasure in doing things Not at all   Feeling down, depressed, irritable, or hopeless Not at all   Total Score PHQ 2 0   Trouble falling or staying asleep, or sleeping too much Not at all   Feeling tired or having little energy Not at all   Poor appetite, weight loss, or overeating Not at all   Feeling bad about yourself - or that you are a failure or have let yourself or your family down Not at all   Trouble concentrating on things such as school, work, reading, or watching TV Not at all   Moving or speaking so slowly that other people could have noticed; or the opposite being so fidgety that others notice Not at all   Thoughts of being better off dead, or hurting yourself in some way Not at all   PHQ 9 Score 0   How difficult have these problems made it for you to do your work, take care of your home and get along with others Not difficult at all       Learning Assessment:  No flowsheet data found. Abuse Screening:  No flowsheet data found. Fall Risk  Fall Risk Assessment, last 12 mths 3/4/2021   Able to walk? Yes   Fall in past 12 months? 0   Do you feel unsteady? 0   Are you worried about falling 0       Health Maintenance reviewed and discussed and ordered per Provider. Health Maintenance Due   Topic Date Due    Hepatitis C Screening  Never done    COVID-19 Vaccine (1 of 2) Never done    DTaP/Tdap/Td series (1 - Tdap) Never done   . Coordination of Care:  1. Have you been to the ER, urgent care clinic since your last visit? Hospitalized since your last visit? no    2. Have you seen or consulted any other health care providers outside of the [address] [place] [address] since your last visit?  Include any pap smears or colon screening.  no

## 2021-05-05 ENCOUNTER — OFFICE VISIT (OUTPATIENT)
Dept: ORTHOPEDIC SURGERY | Age: 26
End: 2021-05-05
Payer: MEDICAID

## 2021-05-05 VITALS — WEIGHT: 260 LBS | BODY MASS INDEX: 30.08 KG/M2 | HEIGHT: 78 IN | RESPIRATION RATE: 16 BRPM

## 2021-05-05 DIAGNOSIS — M25.312 SHOULDER INSTABILITY, LEFT: Primary | ICD-10-CM

## 2021-05-05 PROCEDURE — 99214 OFFICE O/P EST MOD 30 MIN: CPT | Performed by: ORTHOPAEDIC SURGERY

## 2021-05-05 NOTE — PROGRESS NOTES
Patient: Logan Gauthier                MRN: [id number]       SSN: [id number]  YOB: 1995        AGE: 30 y.o. SEX: male  Body mass index is 29.29 kg/m². PCP: Tereso Ceron MD  05/05/21    CHIEF COMPLAINT: Left shoulder instability    HPI: Logan Gauthier is a 30 y.o. 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 He reports 1-2 instability events per year. Initial instability was a traumatic injury while playing football when he was 14years old. He said the shoulder popped out of joint was reduced on the field by a . The second event he required treatment in the emergency room for shoulder dislocation. Since then he has been able to reduce them on his own. He gets pain and a feeling of instability with abduction and external rotation. He has not had any surgery. Overall he can do most of the things that he wants to do. Past Medical History:   Diagnosis Date    Dislocation of left shoulder joint     x3    Dislocation of right shoulder joint     x2       Family History   Problem Relation Age of Onset    Hypertension Other         Uncle    Other Other         Heart problems: Uncle, grandmother   Padmini García Arthritis-osteo Mother        Current Outpatient Medications   Medication Sig Dispense Refill    melatonin 5 mg cap capsule Take 5 mg by mouth nightly.  methocarbamol (ROBAXIN) 500 mg tablet Take 1 Tab by mouth four (4) times daily. (Patient not taking: Reported on 11/24/2020) 60 Tab 1    naproxen (NAPROSYN) 500 mg tablet Take 1 Tab by mouth two (2) times daily (with meals). (Patient not taking: Reported on 11/24/2020) 60 Tab 1    hydrocodone-acetaminophen (NORCO) 5-325 mg per tablet Take 1 tablet by mouth every four (4) hours as needed for Pain.  (Patient not taking: Reported on 11/24/2020) 12 tablet 0       Allergies   Allergen Reactions    Milk Diarrhea       Past Surgical History:   Procedure Laterality Date    HX HERNIA REPAIR  2008    HX HERNIA REPAIR  2008       Social History     Socioeconomic History    Marital status: SINGLE     Spouse name: Not on file    Number of children: Not on file    Years of education: Not on file    Highest education level: Not on file   Occupational History    Not on file   Social Needs    Financial resource strain: Not on file    Food insecurity     Worry: Not on file     Inability: Not on file    Transportation needs     Medical: Not on file     Non-medical: Not on file   Tobacco Use    Smoking status: Never Smoker    Smokeless tobacco: Never Used   Substance and Sexual Activity    Alcohol use:  Yes     Alcohol/week: 1.0 standard drinks     Types: 1 Shots of liquor per week    Drug use: Not Currently     Frequency: 4.0 times per week     Types: Marijuana    Sexual activity: Yes     Partners: Female     Birth control/protection: Condom   Lifestyle    Physical activity     Days per week: Not on file     Minutes per session: Not on file    Stress: Not on file   Relationships    Social connections     Talks on phone: Not on file     Gets together: Not on file     Attends Confucianist service: Not on file     Active member of club or organization: Not on file     Attends meetings of clubs or organizations: Not on file     Relationship status: Not on file    Intimate partner violence     Fear of current or ex partner: Not on file     Emotionally abused: Not on file     Physically abused: Not on file     Forced sexual activity: Not on file   Other Topics Concern    Not on file   Social History Narrative    Not on file       REVIEW OF SYSTEMS:      CON: negative for recent weight loss/gain, fever, or chills  EYE: negative for double or blurry vision  ENT: negative for hoarseness  RS:   negative for cough, URI, SOB  CV:  negative for chest pain, palpitations  GI:    negative for blood in stool, nausea/vomiting  :  negative for blood in urine  MS: As per HPI  Other systems reviewed and noted below. PHYSICAL EXAMINATION:  Visit Vitals  Resp 16   Ht 6' 7\" (2.007 m)   Wt 260 lb (117.9 kg)   BMI 29.29 kg/m²     Body mass index is 29.29 kg/m². GENERAL: Alert and oriented x3, in no acute distress, well-developed, well-nourished. HEENT: Normocephalic, atraumatic. RESP: Non labored breathing with equal chest rise on inspiration. CV: Well perfused extremities. No cyanosis or clubbing noted. ABDOMEN: Soft, non-tender, non-distended. Shoulder Examination     R   L  ROM   FF  Full   Full  ER  Full   Full   IR  Full   Full  Rotator Cuff Pain   Supra  -   -   Infra  -   -   Subscap -   -  Crepitus  -   -  Effusion  -   -  Warmth  -   -   Erythema  -   -  Instability  -   Apprehension Anterior  AC Joint TTP  -   -  Clavicle   Deformity -   -   TTP  -   -  Proximal Humerus   Deformity -   -   TTP  -   -  Deltoid Strength 5   5  Biceps Strength 5   5  Biceps Deformity -   -  Biceps Groove Pain -   -  Impingement Sign -   -       IMAGING:  X-rays previously taken of the left shoulder were reviewed. These do not show any acute injury or dislocation however there is a concern for anterior inferior glenoid bone loss. ASSESSMENT & PLAN  Diagnosis: Left shoulder traumatic anterior inferior instability. Pavan Taveras has a history of instability of his left shoulder over 10 years. He says that his shoulder is getting easier to have instability. I would like to get a better look at the joint with an MRI and a CT scan. The MRI is to evaluate the ligamentous structures as well as the rotator cuff and the CT scan is to better evaluate the bony anatomy of the shoulder joint. These will both be for surgical planning purposes. I will see him back after those are completed.       Electronically signed by: Mahsa Maldonado MD

## 2021-05-19 DIAGNOSIS — M25.312 SHOULDER INSTABILITY, LEFT: ICD-10-CM

## 2023-08-15 ENCOUNTER — OFFICE VISIT (OUTPATIENT)
Facility: CLINIC | Age: 28
End: 2023-08-15
Payer: MEDICAID

## 2023-08-15 VITALS
OXYGEN SATURATION: 98 % | HEART RATE: 71 BPM | HEIGHT: 78 IN | SYSTOLIC BLOOD PRESSURE: 116 MMHG | TEMPERATURE: 98.7 F | BODY MASS INDEX: 24.99 KG/M2 | RESPIRATION RATE: 20 BRPM | DIASTOLIC BLOOD PRESSURE: 67 MMHG | WEIGHT: 216 LBS

## 2023-08-15 DIAGNOSIS — Z00.00 ENCOUNTER FOR WELL ADULT EXAM WITHOUT ABNORMAL FINDINGS: ICD-10-CM

## 2023-08-15 DIAGNOSIS — E07.9 THYROID DISORDER: ICD-10-CM

## 2023-08-15 DIAGNOSIS — Z00.00 GENERAL MEDICAL EXAM: Primary | ICD-10-CM

## 2023-08-15 DIAGNOSIS — Z11.59 NEED FOR HEPATITIS C SCREENING TEST: ICD-10-CM

## 2023-08-15 DIAGNOSIS — E55.9 HYPOVITAMINOSIS D: ICD-10-CM

## 2023-08-15 DIAGNOSIS — R79.89 LOW TESTOSTERONE: ICD-10-CM

## 2023-08-15 DIAGNOSIS — Z00.00 GENERAL MEDICAL EXAM: ICD-10-CM

## 2023-08-15 DIAGNOSIS — R73.9 HYPERGLYCEMIA: ICD-10-CM

## 2023-08-15 PROBLEM — M54.41 ACUTE RIGHT-SIDED LOW BACK PAIN WITH RIGHT-SIDED SCIATICA: Status: ACTIVE | Noted: 2017-11-02

## 2023-08-15 PROCEDURE — 99395 PREV VISIT EST AGE 18-39: CPT | Performed by: FAMILY MEDICINE

## 2023-08-15 SDOH — ECONOMIC STABILITY: FOOD INSECURITY: WITHIN THE PAST 12 MONTHS, YOU WORRIED THAT YOUR FOOD WOULD RUN OUT BEFORE YOU GOT MONEY TO BUY MORE.: SOMETIMES TRUE

## 2023-08-15 SDOH — ECONOMIC STABILITY: HOUSING INSECURITY
IN THE LAST 12 MONTHS, WAS THERE A TIME WHEN YOU DID NOT HAVE A STEADY PLACE TO SLEEP OR SLEPT IN A SHELTER (INCLUDING NOW)?: YES

## 2023-08-15 SDOH — ECONOMIC STABILITY: INCOME INSECURITY: HOW HARD IS IT FOR YOU TO PAY FOR THE VERY BASICS LIKE FOOD, HOUSING, MEDICAL CARE, AND HEATING?: SOMEWHAT HARD

## 2023-08-15 SDOH — ECONOMIC STABILITY: FOOD INSECURITY: WITHIN THE PAST 12 MONTHS, THE FOOD YOU BOUGHT JUST DIDN'T LAST AND YOU DIDN'T HAVE MONEY TO GET MORE.: SOMETIMES TRUE

## 2023-08-15 ASSESSMENT — PATIENT HEALTH QUESTIONNAIRE - PHQ9
SUM OF ALL RESPONSES TO PHQ QUESTIONS 1-9: 0
2. FEELING DOWN, DEPRESSED OR HOPELESS: 0
1. LITTLE INTEREST OR PLEASURE IN DOING THINGS: 0
SUM OF ALL RESPONSES TO PHQ QUESTIONS 1-9: 0
SUM OF ALL RESPONSES TO PHQ9 QUESTIONS 1 & 2: 0

## 2023-08-15 NOTE — PROGRESS NOTES
Well Adult Note  Name: Mary Lou Corado Date: 8/15/2023   MRN: [id number] Sex: Male   Age: 25 y.o. Ethnicity:  / Craig Rojas   : 1995 Race: Black / Sanjuana Quinteros is here for well adult exam.  History:  General medical exam: Patient comes in for general medical exam.  Physical exam is stable. I will check labs. Low testosterone: Patient wonders about having a low testosterone. He would like to have testosterone levels checked. Occasionally has low energy. Hypovitaminosis D: We will check vitamin D levels. Thyroid disorder: We will check TSH. Patient has low energy and occasional malaise and fatigue. Hyperglycemia: We will check HbA1c. Health maintenance: Patient declines tetanus vaccine and also declines flu vaccine. Review of Systems Review of all systems is negative except as noted above in the HPI. Past Medical History  Past Medical History:   Diagnosis Date    Dislocation of left shoulder joint     x3    Dislocation of right shoulder joint     x2       Surgical History  Past Surgical History:   Procedure Laterality Date    HERNIA REPAIR      HERNIA REPAIR          Medications  No current outpatient medications on file. No current facility-administered medications for this visit. Allergies  Allergies   Allergen Reactions    Milk (Cow) Diarrhea       Family History  Family History   Problem Relation Age of Onset    Osteoarthritis Mother     Other Other         Heart problems: Uncle, grandmother    Hypertension Other         Uncle       Social History  Social History     Socioeconomic History    Marital status: Single     Spouse name: Not on file    Number of children: Not on file    Years of education: Not on file    Highest education level: Not on file   Occupational History    Not on file   Tobacco Use    Smoking status: Never    Smokeless tobacco: Never   Substance and Sexual Activity    Alcohol use:  Yes     Alcohol/week: 1.0 standard

## 2023-08-15 NOTE — PROGRESS NOTES
1. \"Have you been to the ER, urgent care clinic since your last visit? Hospitalized since your last visit? \"No    2. \"Have you seen or consulted any other health care providers outside of the [address] [place] [address] since your last visit? \" No    3. For patients aged 43-73: Has the patient had a colonoscopy / FIT/ Cologuard? Not applicable      If the patient is female:    4. For patients aged 43-66: Has the patient had a mammogram within the past 2 years? Not applicable      5. For patients aged 21-65: Has the patient had a pap smear?  Not applicable

## 2023-08-16 LAB
25(OH)D3+25(OH)D2 SERPL-MCNC: 23.9 NG/ML (ref 30–100)
ALBUMIN SERPL-MCNC: 4.4 G/DL (ref 4.3–5.2)
ALBUMIN/GLOB SERPL: 1.4 {RATIO} (ref 1.2–2.2)
ALP SERPL-CCNC: 84 IU/L (ref 44–121)
ALT SERPL-CCNC: 11 IU/L (ref 0–44)
AST SERPL-CCNC: 18 IU/L (ref 0–40)
BASOPHILS # BLD AUTO: 0 X10E3/UL (ref 0–0.2)
BASOPHILS NFR BLD AUTO: 1 %
BILIRUB SERPL-MCNC: 0.6 MG/DL (ref 0–1.2)
BUN SERPL-MCNC: 7 MG/DL (ref 6–20)
BUN/CREAT SERPL: 7 (ref 9–20)
CALCIUM SERPL-MCNC: 9.5 MG/DL (ref 8.7–10.2)
CHLORIDE SERPL-SCNC: 102 MMOL/L (ref 96–106)
CHOLEST SERPL-MCNC: 155 MG/DL (ref 100–199)
CO2 SERPL-SCNC: 25 MMOL/L (ref 20–29)
CREAT SERPL-MCNC: 1 MG/DL (ref 0.76–1.27)
EGFRCR SERPLBLD CKD-EPI 2021: 104 ML/MIN/1.73
EOSINOPHIL # BLD AUTO: 0.1 X10E3/UL (ref 0–0.4)
EOSINOPHIL NFR BLD AUTO: 2 %
ERYTHROCYTE [DISTWIDTH] IN BLOOD BY AUTOMATED COUNT: 12.8 % (ref 11.6–15.4)
GLOBULIN SER CALC-MCNC: 3.1 G/DL (ref 1.5–4.5)
GLUCOSE SERPL-MCNC: 87 MG/DL (ref 70–99)
HBA1C MFR BLD: 5.3 % (ref 4.8–5.6)
HCT VFR BLD AUTO: 41.7 % (ref 37.5–51)
HCV IGG SERPL QL IA: NON REACTIVE
HDLC SERPL-MCNC: 47 MG/DL
HGB BLD-MCNC: 14.1 G/DL (ref 13–17.7)
IMM GRANULOCYTES # BLD AUTO: 0 X10E3/UL (ref 0–0.1)
IMM GRANULOCYTES NFR BLD AUTO: 0 %
LDLC SERPL CALC-MCNC: 98 MG/DL (ref 0–99)
LYMPHOCYTES # BLD AUTO: 1.3 X10E3/UL (ref 0.7–3.1)
LYMPHOCYTES NFR BLD AUTO: 42 %
MCH RBC QN AUTO: 28.7 PG (ref 26.6–33)
MCHC RBC AUTO-ENTMCNC: 33.8 G/DL (ref 31.5–35.7)
MCV RBC AUTO: 85 FL (ref 79–97)
MONOCYTES # BLD AUTO: 0.3 X10E3/UL (ref 0.1–0.9)
MONOCYTES NFR BLD AUTO: 10 %
NEUTROPHILS # BLD AUTO: 1.4 X10E3/UL (ref 1.4–7)
NEUTROPHILS NFR BLD AUTO: 45 %
PLATELET # BLD AUTO: 188 X10E3/UL (ref 150–450)
POTASSIUM SERPL-SCNC: 4.4 MMOL/L (ref 3.5–5.2)
PROT SERPL-MCNC: 7.5 G/DL (ref 6–8.5)
RBC # BLD AUTO: 4.92 X10E6/UL (ref 4.14–5.8)
SODIUM SERPL-SCNC: 140 MMOL/L (ref 134–144)
TRIGL SERPL-MCNC: 47 MG/DL (ref 0–149)
TSH SERPL DL<=0.005 MIU/L-ACNC: 1.25 UIU/ML (ref 0.45–4.5)
VLDLC SERPL CALC-MCNC: 10 MG/DL (ref 5–40)
WBC # BLD AUTO: 3 X10E3/UL (ref 3.4–10.8)

## 2023-08-21 LAB
TESTOST FREE SERPL-MCNC: 13.9 PG/ML (ref 8.7–25.1)
TESTOST SERPL-MCNC: 589 NG/DL (ref 264–916)

## 2025-04-02 ENCOUNTER — OFFICE VISIT (OUTPATIENT)
Facility: CLINIC | Age: 30
End: 2025-04-02
Payer: MEDICAID

## 2025-04-02 VITALS
WEIGHT: 243 LBS | SYSTOLIC BLOOD PRESSURE: 136 MMHG | HEIGHT: 78 IN | BODY MASS INDEX: 28.11 KG/M2 | DIASTOLIC BLOOD PRESSURE: 84 MMHG | OXYGEN SATURATION: 98 % | HEART RATE: 85 BPM | TEMPERATURE: 98 F | RESPIRATION RATE: 20 BRPM

## 2025-04-02 DIAGNOSIS — M54.41 CHRONIC MIDLINE LOW BACK PAIN WITH BILATERAL SCIATICA: Primary | ICD-10-CM

## 2025-04-02 DIAGNOSIS — E55.9 HYPOVITAMINOSIS D: ICD-10-CM

## 2025-04-02 DIAGNOSIS — Z00.00 GENERAL MEDICAL EXAM: ICD-10-CM

## 2025-04-02 DIAGNOSIS — R73.9 HYPERGLYCEMIA: ICD-10-CM

## 2025-04-02 DIAGNOSIS — G89.29 CHRONIC MIDLINE LOW BACK PAIN WITH BILATERAL SCIATICA: Primary | ICD-10-CM

## 2025-04-02 DIAGNOSIS — M54.42 CHRONIC MIDLINE LOW BACK PAIN WITH BILATERAL SCIATICA: Primary | ICD-10-CM

## 2025-04-02 LAB
BASOPHILS # BLD: 0 % (ref 0–2)
BASOPHILS ABSOLUTE: 0 K/UL (ref 0–0.2)
EOSINOPHIL # BLD: 1 % (ref 0–6)
EOSINOPHILS ABSOLUTE: 0.1 K/UL (ref 0–0.5)
ESTIMATED AVERAGE GLUCOSE: 102 MG/DL (ref 91–123)
HBA1C MFR BLD: 5.2 % (ref 4.8–5.6)
HCT VFR BLD CALC: 40.8 % (ref 36.6–51.9)
HEMOGLOBIN: 13.8 G/DL (ref 13.2–17.3)
LYMPHOCYTES # BLD: 27 % (ref 20–45)
LYMPHOCYTES ABSOLUTE: 1.6 K/UL (ref 1–4.8)
MCH RBC QN AUTO: 29 PG (ref 26–34)
MCHC RBC AUTO-ENTMCNC: 34 G/DL (ref 31–36)
MCV RBC AUTO: 86 FL (ref 80–95)
MONOCYTES ABSOLUTE: 0.6 K/UL (ref 0.1–1)
MONOCYTES: 10 % (ref 3–12)
NEUTROPHILS ABSOLUTE: 3.5 K/UL (ref 1.8–7.7)
NEUTROPHILS SEGMENTED: 61 % (ref 40–75)
PDW BLD-RTO: 12 % (ref 10–15.5)
PLATELET # BLD: 206 K/UL (ref 140–440)
PMV BLD AUTO: 10.1 FL (ref 9–13)
RBC # BLD: 4.73 M/UL (ref 3.8–5.8)
WBC # BLD: 5.7 K/UL (ref 4–11)

## 2025-04-02 PROCEDURE — 99213 OFFICE O/P EST LOW 20 MIN: CPT | Performed by: FAMILY MEDICINE

## 2025-04-02 RX ORDER — CYCLOBENZAPRINE HCL 10 MG
10 TABLET ORAL EVERY 8 HOURS PRN
COMMUNITY
Start: 2025-03-31 | End: 2025-04-05

## 2025-04-02 RX ORDER — NAPROXEN 500 MG/1
500 TABLET ORAL 2 TIMES DAILY
COMMUNITY
Start: 2025-03-31

## 2025-04-02 RX ORDER — LIDOCAINE 4 G/G
1 PATCH TOPICAL EVERY 12 HOURS
COMMUNITY
Start: 2025-03-31

## 2025-04-02 RX ORDER — ACETAMINOPHEN 500 MG
500 TABLET ORAL EVERY 4 HOURS PRN
COMMUNITY
Start: 2025-03-31 | End: 2025-04-10

## 2025-04-02 SDOH — ECONOMIC STABILITY: FOOD INSECURITY: WITHIN THE PAST 12 MONTHS, YOU WORRIED THAT YOUR FOOD WOULD RUN OUT BEFORE YOU GOT MONEY TO BUY MORE.: SOMETIMES TRUE

## 2025-04-02 SDOH — ECONOMIC STABILITY: FOOD INSECURITY: WITHIN THE PAST 12 MONTHS, THE FOOD YOU BOUGHT JUST DIDN'T LAST AND YOU DIDN'T HAVE MONEY TO GET MORE.: SOMETIMES TRUE

## 2025-04-02 ASSESSMENT — PATIENT HEALTH QUESTIONNAIRE - PHQ9
2. FEELING DOWN, DEPRESSED OR HOPELESS: NOT AT ALL
1. LITTLE INTEREST OR PLEASURE IN DOING THINGS: NOT AT ALL
SUM OF ALL RESPONSES TO PHQ QUESTIONS 1-9: 0

## 2025-04-02 NOTE — PROGRESS NOTES
\"Have you been to the ER, urgent care clinic since your last visit?  Hospitalized since your last visit?\"    YES - When: approximately 3 days ago.  Where and Why: [facility]  back pain.    “Have you seen or consulted any other health care providers outside our system since your last visit?”    NO

## 2025-04-02 NOTE — PROGRESS NOTES
HPI  Long Jarvis comes in for follow up care.  Low back pain with sciatica: Patient has low back pain and sciatica.  He has been doing repetitive motions and strenuous activity.  Started having low back pain with radicular symptoms radiating on both the right and left lower extremities.  Denies bladder or bowel dysfunction however.  He has trouble bending or standing up straight or walking due to the back pain.  Was seen in the emergency room and had an x-ray done that showed spondylolysis.  He was given Tylenol and Flexeril.  He has been taking these medications.  He is also using the lidocaine patch and these have been helping somewhat.  He comes in for follow-up care.  We discussed management options.  I will refer him to the spine specialist for further evaluation and management.  I will also refer him to physical therapy.  He will continue with supportive care measures.  X-ray lumbar spine was reported as:  No acute findings. Mild lower lumbar spondylosis.   General medical exam: We will check labs.  I will check CBC, CMP and lipid panel.  Hypovitaminosis D: Patient has hypovitaminosis D.  Will recheck labs.  Hyperglycemia: I will check HbA1c.      Past Medical History  Past Medical History:   Diagnosis Date    Dislocation of left shoulder joint     x3    Dislocation of right shoulder joint     x2       Surgical History  Past Surgical History:   Procedure Laterality Date    HERNIA REPAIR  2008    HERNIA REPAIR  2008        Medications  Current Outpatient Medications   Medication Sig Dispense Refill    acetaminophen (TYLENOL) 500 MG tablet Take 1 tablet by mouth every 4 hours as needed      cyclobenzaprine (FLEXERIL) 10 MG tablet Take 1 tablet by mouth every 8 hours as needed      naproxen (NAPROSYN) 500 MG tablet Take 1 tablet by mouth 2 times daily      lidocaine 4 % external patch Place 1 patch onto the skin in the morning and 1 patch in the evening.       No current facility-administered medications for

## 2025-04-03 LAB
A/G RATIO: 1.6 RATIO (ref 1.1–2.6)
ALBUMIN: 4.4 G/DL (ref 3.5–5)
ALP BLD-CCNC: 73 U/L (ref 25–115)
ALT SERPL-CCNC: 12 U/L (ref 5–40)
ANION GAP SERPL CALCULATED.3IONS-SCNC: 13 MMOL/L (ref 3–15)
AST SERPL-CCNC: 19 U/L (ref 10–37)
BILIRUB SERPL-MCNC: 0.7 MG/DL (ref 0.2–1.2)
BUN BLDV-MCNC: 10 MG/DL (ref 6–22)
CALCIUM SERPL-MCNC: 9.9 MG/DL (ref 8.4–10.5)
CHLORIDE BLD-SCNC: 104 MMOL/L (ref 98–110)
CHOLESTEROL, TOTAL: 153 MG/DL (ref 110–200)
CHOLESTEROL/HDL RATIO: 3.1 (ref 0–5)
CO2: 25 MMOL/L (ref 20–32)
CREAT SERPL-MCNC: 1 MG/DL (ref 0.5–1.2)
GFR, ESTIMATED: >60 ML/MIN/1.73 SQ.M.
GLOBULIN: 2.8 G/DL (ref 2–4)
GLUCOSE: 102 MG/DL (ref 70–99)
HDLC SERPL-MCNC: 49 MG/DL
LDL CHOLESTEROL: 92 MG/DL (ref 50–99)
LDL/HDL RATIO: 1.9
NON-HDL CHOLESTEROL: 104 MG/DL
POTASSIUM SERPL-SCNC: 4.2 MMOL/L (ref 3.5–5.5)
SODIUM BLD-SCNC: 142 MMOL/L (ref 133–145)
TOTAL PROTEIN: 7.2 G/DL (ref 6.4–8.3)
TRIGL SERPL-MCNC: 56 MG/DL (ref 40–149)
VITAMIN D 25-HYDROXY: 19.8 NG/ML (ref 32–100)
VLDLC SERPL CALC-MCNC: 11 MG/DL (ref 8–30)

## 2025-04-03 RX ORDER — ERGOCALCIFEROL 1.25 MG/1
50000 CAPSULE, LIQUID FILLED ORAL WEEKLY
Qty: 13 CAPSULE | Refills: 3 | Status: SHIPPED | OUTPATIENT
Start: 2025-04-03

## 2025-04-15 ENCOUNTER — RESULTS FOLLOW-UP (OUTPATIENT)
Facility: CLINIC | Age: 30
End: 2025-04-15

## 2025-04-29 ENCOUNTER — HOSPITAL ENCOUNTER (OUTPATIENT)
Facility: HOSPITAL | Age: 30
Setting detail: RECURRING SERIES
Discharge: HOME | End: 2025-05-02
Payer: MEDICAID

## 2025-04-29 PROCEDURE — 97161 PT EVAL LOW COMPLEX 20 MIN: CPT

## 2025-04-29 PROCEDURE — 97535 SELF CARE MNGMENT TRAINING: CPT

## 2025-04-29 NOTE — THERAPY EVALUATION
[facility]  [address] [address] Ph: [phone] Fx: [phone]  Plan of Care / Statement of Necessity for Physical Therapy Services     Patient Name: Long Jarvis : 1995   Medical   Diagnosis: Chronic midline low back pain with bilateral sciatica Treatment Diagnosis:   M54.41  LUMBAGO WITH SCIATICA, RIGHT SIDE and M54.42  LUMBAGO WITH SCIATICA, LEFT SIDE    Onset Date: One month ago      Referral Source: Neelima Chandler MD Start of Care (SOC): 2025   Prior Hospitalization: See medical history Provider #: [id number]   Prior Level of Function: Independent    Comorbidities:  Other: recurrent Shoulder dislocation, depression, hyperglycemia      Assessment / key information:  Patient is 29 year old male presenting to PT with complaints of DX back pain with B sciatica . Patient notes low back pain with  pain started about 1 month ago, patient went to ER xray taken with no significant finding. Reports pain not relieved with OTC pain medication. Presents to OPPT d/t gradually worsened pain   Self reported functional deficits include transferring, prolonged walking, static standing, bending, lifting, squatting, doing yard work and household chores.    PT evaluation reveals decreased L/s AROM, L lateral shift l/s, apparent leg length discrepancy,  strength impairments, functional movement abnormalities, balance limitations and flexibility deficits.    Patient would benefit from skilled PT to address noted impairments in order to return to PLOF, maintain independence and reduce risk of further debility.      Not post operative, standard auth procedure    Evaluation Complexity:  History:  MEDIUM  Complexity : 1-2 comorbidities / personal factors will impact the outcome/ POC ; Examination:  MEDIUM Complexity : 3 Standardized tests and measures addressin body structure, function, activity limitation and / or participation in recreation

## 2025-04-29 NOTE — PROGRESS NOTES
PHYSICAL / OCCUPATIONAL THERAPY - DAILY TREATMENT NOTE AND LUMBAR EVALUATION    Patient Name: Long Jarvis    Date: 2025    : 1995  Insurance: Payor: [facility] MEDICAID / Plan: ANIA [facility] HEALTHKEEPERS PLUS / Product Type: *No Product type* /      Patient  verified Yes     Visit #   Current / Total 1 16   Time   In / Out 850 930   Pain   In / Out 4.5 4.5   Subjective Functional Status/Changes: See Plan of Care     TREATMENT AREA =  Chronic midline low back pain with bilateral sciatica    SUBJECTIVE  History/Mechanism of Injury: Long Jarvis is a 29 y.o. male with Dx of Chronic midline low back pain with bilateral sciatica.  Reports pain started with sports injury a while  ago, reports sharp pain about a month ago when lifting his laundry basket went to ER , Xray imaging done with no significant finding.  Presents to clinic wearing a borrowed soft back brace Reprts that is relieves his pain   Noted a lateral shift to L side   Amb with lateral shift   Reports a fall not related to back pain 2 weeks ago     Low back on the right side    Limited: flexibility, limited ADL, lifting, sleep affect wakes up several times   Can tolerate 10-15 minute sitting,  standing 30-45 min   Worked as  and at [email or web address]   Prior level of function: independent   Comorbidities/Allergies: recurrent Shoulder dislocation, depression, hyperglycemia   Diagnostic Tests: Xray no fx mild spondylosis   Pain:  Worst: 10/10  Best: 3/10  Location: LBP and R LE   Aggravated by: lifting constant pain   Alleviated by: rest takes the edge off   Previous Treatment: PT distant hx for Low back   Mobility Devices: none  Falls: y  Living Situation: 12 stairs to front door   Work History: works at [email or web address]   Social/Recreational Activities: sports gym     OBJECTIVE      25 min    [x]Eval (un-timed code)                   Therapeutic Procedures:  Tx Min Billable or 1:1 Min (if diff from Tx Min) Procedure, Rationale, Specifics   8

## 2025-05-07 ENCOUNTER — OFFICE VISIT (OUTPATIENT)
Age: 30
End: 2025-05-07
Payer: MEDICAID

## 2025-05-07 VITALS — TEMPERATURE: 46.4 F | WEIGHT: 235 LBS | HEIGHT: 78 IN | BODY MASS INDEX: 27.19 KG/M2

## 2025-05-07 DIAGNOSIS — M54.16 LUMBAR NEURITIS: Primary | ICD-10-CM

## 2025-05-07 DIAGNOSIS — M51.360 DEGENERATION OF INTERVERTEBRAL DISC OF LUMBAR REGION WITH DISCOGENIC BACK PAIN: ICD-10-CM

## 2025-05-07 PROCEDURE — 99204 OFFICE O/P NEW MOD 45 MIN: CPT | Performed by: PHYSICAL MEDICINE & REHABILITATION

## 2025-05-07 RX ORDER — METHYLPREDNISOLONE 4 MG/1
TABLET ORAL
Qty: 1 KIT | Refills: 0 | Status: SHIPPED | OUTPATIENT
Start: 2025-05-07

## 2025-05-07 NOTE — PROGRESS NOTES
[facility]  [address]  [place] [address]  Tel: [phone]  Fax: [phone]          INITIAL CONSULTATION      HISTORY OF PRESENT ILLNESS:  Long Jarvis is a 29 y.o. male who was last seen by me on 1/6/2021, he was referred back to me from Neelima Chandler MD secondary to chronic midline lower back pain with bilateral sciatica. He rates his pain  2-4 /10. Patient comes into the office with c/o localized lower back pain, this most recent flare of pain has been ongoing for 2 months with injury while working out. Patient reports the pain previously radiated into the BLE extending in a S1 distribution however that has been alleviated.     Patient says his pain has been holding steady since the initial onset.  He denies change in bowel or bladder habits. His pain is exacerbated by sitting, prolonged walking, and lying. He denies recent fevers, weight loss, rashes, or skin sores. He denies a hx of stomach ulcers or bleeding disorders. He denies a hx of history of spinal surgery or injections. Patient states he had an initial PT evaluation x1 week ago however he has been unable to proceed with additional sessions due to his work schedule. He denies recent chiropractic care. He denies a hx of DM. He reports that to his knowledge his kidneys function properly, GFR over 60 on 4/2/2025. He has taken Lidocaine Patches; some relief provided, Tylenol, Naproxen. Patient has been utilizing a back brace.       PmHx of no significance.      Note from  dated 4/2/2025 indicating patient was seen for low back pain and sciatica. He has been doing repetitive motions and strenuous activity. Started having low back pain with radicular symptoms radiating on both the right and left lower extremities. He has trouble bending or standing up straight or walking due to the back pain. Was seen in the emergency room and had an x-ray done that showed spondylolysis. He was given

## 2025-05-08 ENCOUNTER — HOSPITAL ENCOUNTER (OUTPATIENT)
Facility: HOSPITAL | Age: 30
Setting detail: RECURRING SERIES
End: 2025-05-08
Payer: MEDICAID

## 2025-05-12 ENCOUNTER — HOSPITAL ENCOUNTER (OUTPATIENT)
Facility: HOSPITAL | Age: 30
Setting detail: RECURRING SERIES
Discharge: HOME | End: 2025-05-15
Payer: MEDICAID

## 2025-05-12 PROCEDURE — 97112 NEUROMUSCULAR REEDUCATION: CPT

## 2025-05-12 PROCEDURE — 97110 THERAPEUTIC EXERCISES: CPT

## 2025-05-12 PROCEDURE — 97530 THERAPEUTIC ACTIVITIES: CPT

## 2025-05-12 NOTE — PROGRESS NOTES
PHYSICAL / OCCUPATIONAL THERAPY - DAILY TREATMENT NOTE    Patient Name: Long Jarvis    Date: 2025    : 1995  Insurance: Payor: [facility] MEDICAID / Plan: ANIA [facility] HEALTHKEEPERS PLUS / Product Type: *No Product type* /      Patient  verified Yes     Visit #   Current / Total 2 16   Time   In / Out 303 354   Pain   In / Out 4 3.5   Subjective Functional Status/Changes: \"I've been trying to increase weight bearing on the other side while I do my day to day activities\".  Patient demos L trunk lean in stance and during ambulation. Pain is localized today to R superior glute.      TREATMENT AREA =  Chronic midline low back pain with bilateral sciatica  Lumbago with sciatica, right side  Lumbago with sciatica, left side    OBJECTIVE    Modalities Rationale:     decrease inflammation and decrease pain to improve patient's ability to progress to PLOF and address remaining functional goals.     min [] Estim Unattended, type/location:                                      []  w/ice    []  w/heat    min [] Estim Attended, type/location:                                     []  w/US     []  w/ice    []  w/heat    []  TENS insruct      min []  Mechanical Traction: type/lbs                   []  pro   []  sup   []  int   []  cont    []  before manual    []  after manual    min []  Ultrasound, settings/location:     10 min  unbill [x]  Ice     []  Heat    location/position: R hip/R S/L with 2 pillows under hip, 1 pillow between knees     min []  Paraffin,  details:     min []  Vasopneumatic Device, press/temp:     min []  Whirlpool / Fluido:    If using vaso (only need to measure limb vaso being performed on)      pre-treatment girth :       post-treatment girth :       measured at (landmark location) :      min []  Other:    Skin assessment post-treatment:   Intact      Therapeutic Procedures:    Tx Min Billable or 1:1 Min (if diff from Tx Min) Procedure, Rationale, Specifics   [phone] [id number] Neuromuscular

## 2025-07-02 ENCOUNTER — OFFICE VISIT (OUTPATIENT)
Age: 30
End: 2025-07-02
Payer: MEDICAID

## 2025-07-02 VITALS
HEART RATE: 51 BPM | TEMPERATURE: 98 F | HEIGHT: 78 IN | BODY MASS INDEX: 28.11 KG/M2 | OXYGEN SATURATION: 100 % | WEIGHT: 243 LBS

## 2025-07-02 DIAGNOSIS — M54.16 LUMBAR NEURITIS: Primary | ICD-10-CM

## 2025-07-02 DIAGNOSIS — M51.360 DEGENERATION OF INTERVERTEBRAL DISC OF LUMBAR REGION WITH DISCOGENIC BACK PAIN: ICD-10-CM

## 2025-07-02 PROCEDURE — 99214 OFFICE O/P EST MOD 30 MIN: CPT | Performed by: PHYSICAL MEDICINE & REHABILITATION

## 2025-07-02 RX ORDER — NAPROXEN 500 MG/1
500 TABLET ORAL 2 TIMES DAILY WITH MEALS
Qty: 60 TABLET | Refills: 0 | Status: SHIPPED | OUTPATIENT
Start: 2025-07-02

## 2025-07-02 NOTE — PROGRESS NOTES
[facility]  [address]  [place] [address]  Tel: [phone]  Fax: [phone]          PROGRESS NOTE      HISTORY OF PRESENT ILLNESS:  The patient is a 29 y.o. male and was seen today for follow up of acute thoracic spine pain. Previously seen for chronic midline lower back pain with bilateral sciatica. He rates his pain 2-4/10. Patient comes into the office with c/o localized lower back pain, this most recent flare of pain has been ongoing for 2 months with injury while working out. Patient reports the pain previously radiated into the BLE extending in a S1 distribution however that has been alleviated. Patient says his pain has been holding steady since the initial onset.  He denies change in bowel or bladder habits. His pain is exacerbated by sitting, prolonged walking, and lying. He denies recent fevers, weight loss, rashes, or skin sores. He denies a hx of stomach ulcers or bleeding disorders. He denies a hx of history of spinal surgery or injections. Patient states he had an initial PT evaluation x1 week ago however he has been unable to proceed with additional sessions due to his work schedule. He denies recent chiropractic care. He denies a hx of DM. He reports that to his knowledge his kidneys function properly, GFR over 60 on 4/2/2025. He has taken Lidocaine Patches; some relief provided, Tylenol, Naproxen. Patient has been utilizing a back brace. PmHx of no significance. Note from  dated 4/2/2025 indicating patient was seen for low back pain and sciatica. He has been doing repetitive motions and strenuous activity. Started having low back pain with radicular symptoms radiating on both the right and left lower extremities. He has trouble bending or standing up straight or walking due to the back pain. Was seen in the emergency room and had an x-ray done that showed spondylolysis. He was given Tylenol and Flexeril. He has been taking these

## 2025-09-03 ENCOUNTER — TELEPHONE (OUTPATIENT)
Age: 30
End: 2025-09-03